# Patient Record
Sex: MALE | Race: WHITE | NOT HISPANIC OR LATINO | Employment: FULL TIME | URBAN - METROPOLITAN AREA
[De-identification: names, ages, dates, MRNs, and addresses within clinical notes are randomized per-mention and may not be internally consistent; named-entity substitution may affect disease eponyms.]

---

## 2023-12-26 ENCOUNTER — HOSPITAL ENCOUNTER (EMERGENCY)
Facility: HOSPITAL | Age: 38
Discharge: HOME | End: 2023-12-26
Attending: EMERGENCY MEDICINE
Payer: COMMERCIAL

## 2023-12-26 ENCOUNTER — APPOINTMENT (EMERGENCY)
Dept: CT IMAGING | Facility: HOSPITAL | Age: 38
End: 2023-12-26
Attending: EMERGENCY MEDICINE
Payer: COMMERCIAL

## 2023-12-26 VITALS
RESPIRATION RATE: 16 BRPM | HEIGHT: 69 IN | SYSTOLIC BLOOD PRESSURE: 122 MMHG | OXYGEN SATURATION: 95 % | TEMPERATURE: 97.5 F | WEIGHT: 189 LBS | DIASTOLIC BLOOD PRESSURE: 79 MMHG | HEART RATE: 66 BPM | BODY MASS INDEX: 27.99 KG/M2

## 2023-12-26 DIAGNOSIS — R79.89 ELEVATED SERUM CREATININE: Primary | ICD-10-CM

## 2023-12-26 DIAGNOSIS — N20.1 URETEROLITHIASIS: ICD-10-CM

## 2023-12-26 LAB
ANION GAP SERPL CALCULATED.3IONS-SCNC: 11 MMOL/L
ANION GAP SERPL CALCULATED.3IONS-SCNC: 7 MMOL/L
BACTERIA UR QL AUTO: ABNORMAL /HPF
BASOPHILS # BLD AUTO: 0.09 THOUSANDS/ÂΜL (ref 0–0.1)
BASOPHILS NFR BLD AUTO: 1 % (ref 0–1)
BILIRUB UR QL STRIP: NEGATIVE
BUN SERPL-MCNC: 16 MG/DL (ref 5–25)
BUN SERPL-MCNC: 17 MG/DL (ref 5–25)
CALCIUM SERPL-MCNC: 9 MG/DL (ref 8.4–10.2)
CALCIUM SERPL-MCNC: 9.8 MG/DL (ref 8.4–10.2)
CHLORIDE SERPL-SCNC: 108 MMOL/L (ref 96–108)
CHLORIDE SERPL-SCNC: 109 MMOL/L (ref 96–108)
CLARITY UR: CLEAR
CO2 SERPL-SCNC: 24 MMOL/L (ref 21–32)
CO2 SERPL-SCNC: 27 MMOL/L (ref 21–32)
COLOR UR: YELLOW
CREAT SERPL-MCNC: 1.53 MG/DL (ref 0.6–1.3)
CREAT SERPL-MCNC: 1.56 MG/DL (ref 0.6–1.3)
EOSINOPHIL # BLD AUTO: 0.01 THOUSAND/ÂΜL (ref 0–0.61)
EOSINOPHIL NFR BLD AUTO: 0 % (ref 0–6)
ERYTHROCYTE [DISTWIDTH] IN BLOOD BY AUTOMATED COUNT: 12 % (ref 11.6–15.1)
GFR SERPL CREATININE-BSD FRML MDRD: 55 ML/MIN/1.73SQ M
GFR SERPL CREATININE-BSD FRML MDRD: 56 ML/MIN/1.73SQ M
GLUCOSE SERPL-MCNC: 102 MG/DL (ref 65–140)
GLUCOSE SERPL-MCNC: 116 MG/DL (ref 65–140)
GLUCOSE UR STRIP-MCNC: NEGATIVE MG/DL
HCT VFR BLD AUTO: 45.9 % (ref 36.5–49.3)
HGB BLD-MCNC: 15.9 G/DL (ref 12–17)
HGB UR QL STRIP.AUTO: ABNORMAL
IMM GRANULOCYTES # BLD AUTO: 0.2 THOUSAND/UL (ref 0–0.2)
IMM GRANULOCYTES NFR BLD AUTO: 1 % (ref 0–2)
KETONES UR STRIP-MCNC: ABNORMAL MG/DL
LEUKOCYTE ESTERASE UR QL STRIP: NEGATIVE
LYMPHOCYTES # BLD AUTO: 1 THOUSANDS/ÂΜL (ref 0.6–4.47)
LYMPHOCYTES NFR BLD AUTO: 6 % (ref 14–44)
MCH RBC QN AUTO: 29.6 PG (ref 26.8–34.3)
MCHC RBC AUTO-ENTMCNC: 34.6 G/DL (ref 31.4–37.4)
MCV RBC AUTO: 86 FL (ref 82–98)
MONOCYTES # BLD AUTO: 0.83 THOUSAND/ÂΜL (ref 0.17–1.22)
MONOCYTES NFR BLD AUTO: 5 % (ref 4–12)
MUCOUS THREADS UR QL AUTO: ABNORMAL
NEUTROPHILS # BLD AUTO: 16.17 THOUSANDS/ÂΜL (ref 1.85–7.62)
NEUTS SEG NFR BLD AUTO: 87 % (ref 43–75)
NITRITE UR QL STRIP: NEGATIVE
NON-SQ EPI CELLS URNS QL MICRO: ABNORMAL /HPF
NRBC BLD AUTO-RTO: 0 /100 WBCS
PH UR STRIP.AUTO: 6.5 [PH]
PLATELET # BLD AUTO: 261 THOUSANDS/UL (ref 149–390)
PMV BLD AUTO: 9.8 FL (ref 8.9–12.7)
POTASSIUM SERPL-SCNC: 4.2 MMOL/L (ref 3.5–5.3)
POTASSIUM SERPL-SCNC: 4.7 MMOL/L (ref 3.5–5.3)
PROT UR STRIP-MCNC: ABNORMAL MG/DL
RBC # BLD AUTO: 5.37 MILLION/UL (ref 3.88–5.62)
RBC #/AREA URNS AUTO: ABNORMAL /HPF
SODIUM SERPL-SCNC: 143 MMOL/L (ref 135–147)
SODIUM SERPL-SCNC: 143 MMOL/L (ref 135–147)
SP GR UR STRIP.AUTO: >=1.03 (ref 1–1.03)
UROBILINOGEN UR STRIP-ACNC: <2 MG/DL
WBC # BLD AUTO: 18.3 THOUSAND/UL (ref 4.31–10.16)
WBC #/AREA URNS AUTO: ABNORMAL /HPF

## 2023-12-26 PROCEDURE — 96365 THER/PROPH/DIAG IV INF INIT: CPT

## 2023-12-26 PROCEDURE — 96375 TX/PRO/DX INJ NEW DRUG ADDON: CPT

## 2023-12-26 PROCEDURE — 85025 COMPLETE CBC W/AUTO DIFF WBC: CPT | Performed by: EMERGENCY MEDICINE

## 2023-12-26 PROCEDURE — 36415 COLL VENOUS BLD VENIPUNCTURE: CPT | Performed by: EMERGENCY MEDICINE

## 2023-12-26 PROCEDURE — 74176 CT ABD & PELVIS W/O CONTRAST: CPT

## 2023-12-26 PROCEDURE — 99284 EMERGENCY DEPT VISIT MOD MDM: CPT

## 2023-12-26 PROCEDURE — 99284 EMERGENCY DEPT VISIT MOD MDM: CPT | Performed by: EMERGENCY MEDICINE

## 2023-12-26 PROCEDURE — 80048 BASIC METABOLIC PNL TOTAL CA: CPT | Performed by: EMERGENCY MEDICINE

## 2023-12-26 PROCEDURE — 96361 HYDRATE IV INFUSION ADD-ON: CPT

## 2023-12-26 PROCEDURE — 81001 URINALYSIS AUTO W/SCOPE: CPT | Performed by: EMERGENCY MEDICINE

## 2023-12-26 PROCEDURE — G1004 CDSM NDSC: HCPCS

## 2023-12-26 RX ORDER — KETOROLAC TROMETHAMINE 30 MG/ML
15 INJECTION, SOLUTION INTRAMUSCULAR; INTRAVENOUS ONCE
Status: COMPLETED | OUTPATIENT
Start: 2023-12-26 | End: 2023-12-26

## 2023-12-26 RX ORDER — MORPHINE SULFATE 4 MG/ML
4 INJECTION, SOLUTION INTRAMUSCULAR; INTRAVENOUS ONCE
Status: COMPLETED | OUTPATIENT
Start: 2023-12-26 | End: 2023-12-26

## 2023-12-26 RX ORDER — OXYCODONE HYDROCHLORIDE 5 MG/1
5 TABLET ORAL ONCE
Status: COMPLETED | OUTPATIENT
Start: 2023-12-26 | End: 2023-12-26

## 2023-12-26 RX ORDER — TAMSULOSIN HYDROCHLORIDE 0.4 MG/1
0.4 CAPSULE ORAL ONCE
Status: DISCONTINUED | OUTPATIENT
Start: 2023-12-26 | End: 2023-12-26

## 2023-12-26 RX ORDER — OXYCODONE HYDROCHLORIDE 5 MG/1
5 TABLET ORAL EVERY 6 HOURS PRN
Qty: 8 TABLET | Refills: 0 | Status: SHIPPED | OUTPATIENT
Start: 2023-12-26 | End: 2024-01-05

## 2023-12-26 RX ORDER — ONDANSETRON 4 MG/1
4 TABLET, ORALLY DISINTEGRATING ORAL EVERY 6 HOURS PRN
Qty: 20 TABLET | Refills: 0 | Status: SHIPPED | OUTPATIENT
Start: 2023-12-26

## 2023-12-26 RX ORDER — CEFTRIAXONE 1 G/50ML
1000 INJECTION, SOLUTION INTRAVENOUS ONCE
Status: COMPLETED | OUTPATIENT
Start: 2023-12-26 | End: 2023-12-26

## 2023-12-26 RX ORDER — CEPHALEXIN 500 MG/1
500 CAPSULE ORAL EVERY 12 HOURS SCHEDULED
Qty: 14 CAPSULE | Refills: 0 | Status: SHIPPED | OUTPATIENT
Start: 2023-12-26 | End: 2024-01-02

## 2023-12-26 RX ORDER — ONDANSETRON 2 MG/ML
4 INJECTION INTRAMUSCULAR; INTRAVENOUS ONCE
Status: COMPLETED | OUTPATIENT
Start: 2023-12-26 | End: 2023-12-26

## 2023-12-26 RX ADMIN — OXYCODONE HYDROCHLORIDE 5 MG: 5 TABLET ORAL at 06:52

## 2023-12-26 RX ADMIN — KETOROLAC TROMETHAMINE 15 MG: 30 INJECTION, SOLUTION INTRAMUSCULAR; INTRAVENOUS at 04:06

## 2023-12-26 RX ADMIN — SODIUM CHLORIDE 1000 ML: 0.9 INJECTION, SOLUTION INTRAVENOUS at 04:05

## 2023-12-26 RX ADMIN — CEFTRIAXONE 1000 MG: 1 INJECTION, SOLUTION INTRAVENOUS at 06:57

## 2023-12-26 RX ADMIN — ONDANSETRON 4 MG: 2 INJECTION INTRAMUSCULAR; INTRAVENOUS at 04:05

## 2023-12-26 RX ADMIN — MORPHINE SULFATE 4 MG: 4 INJECTION INTRAVENOUS at 04:06

## 2023-12-26 NOTE — Clinical Note
Case was discussed with LEANNA and the patient's admission status was agreed to be Admission Status: observation status to the service of Dr. Lainez .  
Statement Selected

## 2023-12-26 NOTE — DISCHARGE INSTRUCTIONS
Take acetaminophen 1 g or 2 extra strength tablets every 6-8 hours do not exceed 3 g in 24 hours.    Oxycodone for breakthrough pain    Flomax daily for the next 14 days    Please follow-up with urology for further care, if symptoms worsen please return to the emergency department    Your creatinine was elevated at today's visit, with a level of 1.56 mg/dl will need to be followed up with your primary care provider for recheck    Please drink plenty of fluids    Meanwhile please follow-up with urology for further evaluation of recurrent kidney stones

## 2023-12-26 NOTE — ED PROVIDER NOTES
History  Chief Complaint   Patient presents with    Flank Pain     Pt reports right flank pain that started 2 hours ago when he was laying down, pt reports a similar episode two days ago but he felt like he peed out a kidney stone, pt denies history of kidney stones     38-year-old male with history of cerebral palsy presents for evaluation of right flank pain started acutely at 9 PM, similar pain 2 days ago and feels like he passed something through his penis possibly kidney stone, no documented history of kidney stones in the past.  Pain is sharp, right flank radiating into his lower abdomen associate with nausea and vomiting, no fevers, no urinary frequency urgency or dysuria.        None       Past Medical History:   Diagnosis Date    Cerebral palsy (HCC)        History reviewed. No pertinent surgical history.    History reviewed. No pertinent family history.  I have reviewed and agree with the history as documented.    E-Cigarette/Vaping     E-Cigarette/Vaping Substances     Social History     Tobacco Use    Smoking status: Never    Smokeless tobacco: Never   Substance Use Topics    Alcohol use: Not Currently    Drug use: Not Currently       Review of Systems   Constitutional:  Negative for appetite change, chills and fever.   HENT:  Negative for rhinorrhea and sore throat.    Eyes:  Negative for photophobia and visual disturbance.   Respiratory:  Negative for cough and shortness of breath.    Cardiovascular:  Negative for chest pain and palpitations.   Gastrointestinal:  Positive for nausea and vomiting. Negative for abdominal pain and diarrhea.   Genitourinary:  Positive for flank pain. Negative for dysuria, frequency and urgency.   Skin:  Negative for rash.   Neurological:  Negative for dizziness and weakness.   All other systems reviewed and are negative.      Physical Exam  Physical Exam  Vitals and nursing note reviewed.   Constitutional:       Appearance: He is well-developed.   HENT:      Head:  Normocephalic and atraumatic.      Right Ear: External ear normal.      Left Ear: External ear normal.   Eyes:      Conjunctiva/sclera: Conjunctivae normal.      Pupils: Pupils are equal, round, and reactive to light.   Neck:      Vascular: No JVD.      Trachea: No tracheal deviation.   Cardiovascular:      Rate and Rhythm: Normal rate and regular rhythm.      Heart sounds: Normal heart sounds. No murmur heard.     No friction rub. No gallop.   Pulmonary:      Effort: Pulmonary effort is normal. No respiratory distress.      Breath sounds: No stridor. No wheezing or rales.   Abdominal:      General: There is no distension.      Palpations: Abdomen is soft. There is no mass.      Tenderness: There is no abdominal tenderness. There is right CVA tenderness. There is no guarding or rebound.   Musculoskeletal:         General: Normal range of motion.      Cervical back: Normal range of motion and neck supple.   Skin:     General: Skin is warm and dry.      Coloration: Skin is not pale.      Findings: No erythema or rash.   Neurological:      Mental Status: He is alert and oriented to person, place, and time.      Cranial Nerves: No cranial nerve deficit.         Vital Signs  ED Triage Vitals [12/26/23 0335]   Temperature Pulse Respirations Blood Pressure SpO2   97.5 °F (36.4 °C) 80 18 164/100 96 %      Temp Source Heart Rate Source Patient Position - Orthostatic VS BP Location FiO2 (%)   Oral Monitor Sitting Right arm --      Pain Score       10 - Worst Possible Pain           Vitals:    12/26/23 0335 12/26/23 0430 12/26/23 0600   BP: 164/100 133/77 131/73   Pulse: 80 78 78   Patient Position - Orthostatic VS: Sitting           Visual Acuity      ED Medications  Medications   cefTRIAXone (ROCEPHIN) IVPB (premix in dextrose) 1,000 mg 50 mL (1,000 mg Intravenous New Bag 12/26/23 0657)   ondansetron (ZOFRAN) injection 4 mg (4 mg Intravenous Given 12/26/23 0405)   sodium chloride 0.9 % bolus 1,000 mL (0 mL Intravenous  Stopped 12/26/23 0530)   morphine injection 4 mg (4 mg Intravenous Given 12/26/23 0406)   ketorolac (TORADOL) injection 15 mg (15 mg Intravenous Given 12/26/23 0406)   oxyCODONE (ROXICODONE) IR tablet 5 mg (5 mg Oral Given 12/26/23 0652)       Diagnostic Studies  Results Reviewed       Procedure Component Value Units Date/Time    Urine Microscopic [[id number]]  (Abnormal) Collected: 12/26/23 0611    Lab Status: Final result Specimen: Urine, Clean Catch Updated: 12/26/23 0626     RBC, UA 2-4 /hpf      WBC, UA 0-1 /hpf      Epithelial Cells Occasional /hpf      Bacteria, UA Occasional /hpf      MUCUS THREADS Occasional    UA w Reflex to Microscopic w Reflex to Culture [[id number]]  (Abnormal) Collected: 12/26/23 0611    Lab Status: Final result Specimen: Urine, Clean Catch Updated: 12/26/23 0625     Color, UA Yellow     Clarity, UA Clear     Specific Gravity, UA >=1.030     pH, UA 6.5     Leukocytes, UA Negative     Nitrite, UA Negative     Protein, UA Trace mg/dl      Glucose, UA Negative mg/dl      Ketones, UA 10 (1+) mg/dl      Urobilinogen, UA <2.0 mg/dl      Bilirubin, UA Negative     Occult Blood, UA Small    Basic metabolic panel [[id number]]  (Abnormal) Collected: 12/26/23 0514    Lab Status: Final result Specimen: Blood from Arm, Left Updated: 12/26/23 0532     Sodium 143 mmol/L      Potassium 4.7 mmol/L      Chloride 109 mmol/L      CO2 27 mmol/L      ANION GAP 7 mmol/L      BUN 16 mg/dL      Creatinine 1.53 mg/dL      Glucose 102 mg/dL      Calcium 9.0 mg/dL      eGFR 56 ml/min/1.73sq m     Narrative:      National [employer] Foundation guidelines for Chronic Kidney Disease (CKD):     Stage 1 with normal or high GFR (GFR > 90 mL/min/1.73 square meters)    Stage 2 Mild CKD (GFR = 60-89 mL/min/1.73 square meters)    Stage 3A Moderate CKD (GFR = 45-59 mL/min/1.73 square meters)    Stage 3B Moderate CKD (GFR = 30-44 mL/min/1.73 square meters)    Stage 4 Severe CKD (GFR = 15-29 mL/min/1.73 square meters)     Stage 5 End Stage CKD (GFR <15 mL/min/1.73 square meters)  Note: GFR calculation is accurate only with a steady state creatinine    Basic metabolic panel [[id number]]  (Abnormal) Collected: 12/26/23 0412    Lab Status: Final result Specimen: Blood from Arm, Left Updated: 12/26/23 0434     Sodium 143 mmol/L      Potassium 4.2 mmol/L      Chloride 108 mmol/L      CO2 24 mmol/L      ANION GAP 11 mmol/L      BUN 17 mg/dL      Creatinine 1.56 mg/dL      Glucose 116 mg/dL      Calcium 9.8 mg/dL      eGFR 55 ml/min/1.73sq m     Narrative:      National Kid[employer] Disease Foundation guidelines for Chronic Kidney Disease (CKD):     Stage 1 with normal or high GFR (GFR > 90 mL/min/1.73 square meters)    Stage 2 Mild CKD (GFR = 60-89 mL/min/1.73 square meters)    Stage 3A Moderate CKD (GFR = 45-59 mL/min/1.73 square meters)    Stage 3B Moderate CKD (GFR = 30-44 mL/min/1.73 square meters)    Stage 4 Severe CKD (GFR = 15-29 mL/min/1.73 square meters)    Stage 5 End Stage CKD (GFR <15 mL/min/1.73 square meters)  Note: GFR calculation is accurate only with a steady state creatinine    CBC and differential [[id number]]  (Abnormal) Collected: 12/26/23 0412    Lab Status: Final result Specimen: Blood from Arm, Left Updated: 12/26/23 0418     WBC 18.30 Thousand/uL      RBC 5.37 Million/uL      Hemoglobin 15.9 g/dL      Hematocrit 45.9 %      MCV 86 fL      MCH 29.6 pg      MCHC 34.6 g/dL      RDW 12.0 %      MPV 9.8 fL      Platelets 261 Thousands/uL      nRBC 0 /100 WBCs      Neutrophils Relative 87 %      Immat GRANS % 1 %      Lymphocytes Relative 6 %      Monocytes Relative 5 %      Eosinophils Relative 0 %      Basophils Relative 1 %      Neutrophils Absolute 16.17 Thousands/µL      Immature Grans Absolute 0.20 Thousand/uL      Lymphocytes Absolute 1.00 Thousands/µL      Monocytes Absolute 0.83 Thousand/µL      Eosinophils Absolute 0.01 Thousand/µL      Basophils Absolute 0.09 Thousands/µL                    CT renal stone study  abdomen pelvis wo contrast   Final Result by Lashaun Martinez MD (12/26 0453)      2 mm calculus at the right UVJ causing mild hydroureteronephrosis.      Hepatic steatosis.      The study was marked in EPIC for immediate notification.      Workstation performed: [id number]                    Procedures  Procedures         ED Course  ED Course as of 12/26/23 0658   Tue Dec 26, 2023   0503 Resting comfortably no acute complaints, pain improved   0503 Creatinine(!): 1.56  Unknown baseline no history of CKD   0503 Patient going back to [place] on January 2 discussed elevated creatinine will need follow-up back with his own doctors upon return   0510 Unable to swallow tamsulosin   0625 hey have a patient here in the er, 35 year old male Clinton Samuel [birth year], hx of cp, here visiting form [place] till 1/2 , has a R sided tiny (2 mm) obstructing stone , afebrile, no urinary sx. Creatinine is 1.54 (unknown baseline but no CKD) and WBC of 18 , UA    0639 Contacted urology on-call, to arrange follow-up patient going back to [place] on January 2 will follow-up with urology back in [place] at that point   0652 Urology recommends admission due to elevated white blood cell count CARLOS MANUEL, okay to admit at [facility], recommend starting on antibiotics                               SBIRT 22yo+      Flowsheet Row Most Recent Value   Initial Alcohol Screen: US AUDIT-C     1. How often do you have a drink containing alcohol? 0 Filed at: 12/26/2023 0339   2. How many drinks containing alcohol do you have on a typical day you are drinking?  0 Filed at: 12/26/2023 0339   3a. Male UNDER 65: How often do you have five or more drinks on one occasion? 0 Filed at: 12/26/2023 0339   3b. FEMALE Any Age, or MALE 65+: How often do you have 4 or more drinks on one occassion? 0 Filed at: 12/26/2023 0339   Audit-C Score 0 Filed at: 12/26/2023 0339   FRANCISCO: How many times in the past year have you...    Used an illegal drug or used a prescription medication  for non-medical reasons? Never Filed at: 12/26/2023 0339                      Medical Decision Making  38-year-old male with right-sided flank pain differential diagnosis includes ureterolithiasis, pyelonephritis, urinary tract infection, without any abdominal pain soft nontender abdomen, low suspicion for cholecystitis    Amount and/or Complexity of Data Reviewed  Labs: ordered. Decision-making details documented in ED Course.  Radiology: ordered.    Risk  Prescription drug management.             Disposition  Final diagnoses:   Elevated serum creatinine   Ureterolithiasis     Time reflects when diagnosis was documented in both MDM as applicable and the Disposition within this note       Time User Action Codes Description Comment    12/26/2023  4:55 AM Yuliya Lal [R79.89] Elevated serum creatinine     12/26/2023  4:55 AM Yuliya Lal [N20.1] Ureterolithiasis           ED Disposition       ED Disposition   Admit    Condition   Stable    Date/Time   Tue Dec 26, 2023 0658    Comment   Case was discussed with LEANNA and the patient's admission status was agreed to be Admission Status: observation status to the service of Dr. Lainez .               Follow-up Information       Follow up With Specialties Details Why Contact Info Additional Information     [facility] Emergency Department Emergency Medicine  If symptoms worsen [address] [facility] [address] [id number] [phone] [facility] Emergency Department, [address] [place], [place] [address]            Patient's Medications   Discharge Prescriptions    OXYCODONE (ROXICODONE) 5 IMMEDIATE RELEASE TABLET    Take 1 tablet (5 mg total) by mouth every 6 (six) hours as needed for moderate pain for up to 10 days Max Daily Amount: 20 mg       Start Date: 12/26/2023End Date: 1/5/2024       Order Dose: 5 mg       Quantity: 8 tablet    Refills: 0           PDMP Review       None            ED  Provider  Electronically Signed by             Yuliya Lal,   12/26/23 0649       Yuliya Lal,   12/26/23 0658